# Patient Record
Sex: FEMALE | Race: BLACK OR AFRICAN AMERICAN | NOT HISPANIC OR LATINO | Employment: OTHER | URBAN - METROPOLITAN AREA
[De-identification: names, ages, dates, MRNs, and addresses within clinical notes are randomized per-mention and may not be internally consistent; named-entity substitution may affect disease eponyms.]

---

## 2019-06-27 PROBLEM — I10 ESSENTIAL HYPERTENSION: Status: ACTIVE | Noted: 2019-06-27

## 2019-06-27 PROBLEM — H91.90 HEARING LOSS: Status: ACTIVE | Noted: 2019-06-27

## 2019-06-27 PROBLEM — Z00.00 ANNUAL PHYSICAL EXAM: Status: ACTIVE | Noted: 2019-06-27

## 2019-06-27 PROBLEM — Z00.00 ANNUAL PHYSICAL EXAM: Status: RESOLVED | Noted: 2019-06-27 | Resolved: 2019-06-27

## 2019-09-05 PROBLEM — H90.3 SENSORINEURAL HEARING LOSS (SNHL) OF BOTH EARS: Status: ACTIVE | Noted: 2019-06-27

## 2019-12-23 PROBLEM — R87.619 ATYPICAL GLANDULAR CELLS ON CERVICAL PAP SMEAR: Status: ACTIVE | Noted: 2019-12-23

## 2020-01-28 PROBLEM — L60.9 LOOSE TOENAIL: Status: ACTIVE | Noted: 2020-01-28

## 2020-01-28 PROBLEM — E78.5 HYPERLIPIDEMIA: Status: ACTIVE | Noted: 2020-01-28

## 2020-01-28 PROBLEM — I25.10 CORONARY ARTERY DISEASE: Status: ACTIVE | Noted: 2020-01-28

## 2020-01-28 PROBLEM — M79.675 PAIN AROUND TOENAIL, LEFT FOOT: Status: ACTIVE | Noted: 2020-01-28

## 2022-02-23 PROBLEM — Z87.42 HISTORY OF POSTMENOPAUSAL BLEEDING: Status: ACTIVE | Noted: 2022-02-23

## 2022-04-29 PROBLEM — N87.0 CIN I (CERVICAL INTRAEPITHELIAL NEOPLASIA I): Status: ACTIVE | Noted: 2022-04-29

## 2022-06-19 DIAGNOSIS — U07.1 COVID-19 VIRUS DETECTED: ICD-10-CM

## 2022-07-22 ENCOUNTER — PATIENT OUTREACH (OUTPATIENT)
Dept: ADMINISTRATIVE | Facility: HOSPITAL | Age: 67
End: 2022-07-22
Payer: MEDICARE

## 2022-07-23 PROBLEM — I16.0 HYPERTENSIVE URGENCY: Status: ACTIVE | Noted: 2022-07-23

## 2022-07-23 PROBLEM — M25.552 LEFT HIP PAIN: Status: ACTIVE | Noted: 2022-07-23

## 2022-07-24 PROBLEM — E87.6 HYPOKALEMIA: Status: ACTIVE | Noted: 2022-07-24

## 2022-07-25 PROBLEM — R00.1 BRADYCARDIA: Status: ACTIVE | Noted: 2022-07-25

## 2022-07-25 PROBLEM — E66.9 OBESITY (BMI 30-39.9): Status: ACTIVE | Noted: 2022-07-25

## 2023-06-06 PROBLEM — R00.1 BRADYCARDIA: Status: RESOLVED | Noted: 2022-07-25 | Resolved: 2023-06-06

## 2023-06-06 PROBLEM — M79.675 PAIN AROUND TOENAIL, LEFT FOOT: Status: RESOLVED | Noted: 2020-01-28 | Resolved: 2023-06-06

## 2023-06-06 PROBLEM — E87.6 HYPOKALEMIA: Status: RESOLVED | Noted: 2022-07-24 | Resolved: 2023-06-06

## 2023-06-06 PROBLEM — I16.0 HYPERTENSIVE URGENCY: Status: RESOLVED | Noted: 2022-07-23 | Resolved: 2023-06-06

## 2023-06-06 PROBLEM — L60.9 LOOSE TOENAIL: Status: RESOLVED | Noted: 2020-01-28 | Resolved: 2023-06-06

## 2023-06-07 ENCOUNTER — PATIENT OUTREACH (OUTPATIENT)
Dept: ADMINISTRATIVE | Facility: HOSPITAL | Age: 68
End: 2023-06-07
Payer: MEDICARE

## 2023-09-04 PROBLEM — I63.9 ACUTE STROKE DUE TO ISCHEMIA: Status: ACTIVE | Noted: 2023-09-04

## 2023-09-05 PROBLEM — G81.91 HEMIPARESIS, RIGHT: Status: ACTIVE | Noted: 2023-09-05

## 2023-09-05 PROBLEM — I48.20 CHRONIC ATRIAL FIBRILLATION: Status: ACTIVE | Noted: 2023-09-05

## 2023-09-07 ENCOUNTER — PATIENT OUTREACH (OUTPATIENT)
Dept: ADMINISTRATIVE | Facility: CLINIC | Age: 68
End: 2023-09-07
Payer: MEDICARE

## 2023-09-07 NOTE — PROGRESS NOTES
C3 nurse attempted to contact patient. The following occurred:   C3 nurse attempted to contact Hanane Swanson  for a TCC post hospital discharge follow up call. The patient is unable to conduct the call @ this time. The patient requested a callback.    Non [facility] PCP

## 2023-09-07 NOTE — PROGRESS NOTES
C3 nurse spoke with Hanane Swanson  for a TCC post hospital discharge follow up call. The patient does not have a scheduled HOSFU appointment with Mago Elizabeth MD  within 5-7 days post hospital discharge date 09/06/2023. C3 nurse was unable to schedule HOSFU appointment in [facility].    Please contact pcp  and schedule follow up appointment using HOSFU visit type on or before 09/13/2023.

## 2023-09-07 NOTE — PROGRESS NOTES
C3 nurse attempted to contact patient. The following occurred:   C3 nurse attempted to contact Hanane Swanson  for a TCC post hospital discharge follow up call. The patient is unable to conduct the call @ this time. The patient requested a callback.    The patient does not have a scheduled HOSFU appointment within 5-7 days post hospital discharge date 09/06/2023. Non [facility] PCP

## 2023-09-14 PROBLEM — R29.90 STROKE-LIKE SYMPTOMS: Status: ACTIVE | Noted: 2023-09-14

## 2023-09-15 PROBLEM — R53.1 GENERALIZED WEAKNESS: Status: ACTIVE | Noted: 2023-09-15

## 2023-09-15 PROBLEM — K59.00 CONSTIPATION: Status: ACTIVE | Noted: 2023-09-15

## 2023-09-15 PROBLEM — I35.0 AORTIC STENOSIS: Status: ACTIVE | Noted: 2023-09-15

## 2023-09-15 PROBLEM — E66.09 CLASS 1 OBESITY DUE TO EXCESS CALORIES WITH SERIOUS COMORBIDITY AND BODY MASS INDEX (BMI) OF 32.0 TO 32.9 IN ADULT: Status: ACTIVE | Noted: 2022-07-25

## 2023-09-15 PROBLEM — F33.1 MODERATE EPISODE OF RECURRENT MAJOR DEPRESSIVE DISORDER: Status: ACTIVE | Noted: 2023-09-15

## 2023-12-11 PROBLEM — I63.9 ACUTE STROKE DUE TO ISCHEMIA: Status: RESOLVED | Noted: 2023-09-04 | Resolved: 2023-12-11

## 2024-01-10 ENCOUNTER — TELEPHONE (OUTPATIENT)
Dept: ADMINISTRATIVE | Facility: CLINIC | Age: 69
End: 2024-01-10
Payer: MEDICARE

## 2024-05-16 ENCOUNTER — PATIENT OUTREACH (OUTPATIENT)
Dept: ADMINISTRATIVE | Facility: HOSPITAL | Age: 69
End: 2024-05-16
Payer: MEDICARE

## 2024-06-05 ENCOUNTER — PATIENT MESSAGE (OUTPATIENT)
Dept: ADMINISTRATIVE | Facility: HOSPITAL | Age: 69
End: 2024-06-05
Payer: MEDICARE

## 2024-06-05 ENCOUNTER — PATIENT OUTREACH (OUTPATIENT)
Dept: ADMINISTRATIVE | Facility: HOSPITAL | Age: 69
End: 2024-06-05
Payer: MEDICARE

## 2024-08-05 ENCOUNTER — PATIENT OUTREACH (OUTPATIENT)
Dept: ADMINISTRATIVE | Facility: HOSPITAL | Age: 69
End: 2024-08-05
Payer: MEDICARE

## 2024-08-18 ENCOUNTER — PATIENT OUTREACH (OUTPATIENT)
Dept: ADMINISTRATIVE | Facility: HOSPITAL | Age: 69
End: 2024-08-18
Payer: MEDICARE

## 2024-09-03 PROBLEM — F32.A DEPRESSION: Status: ACTIVE | Noted: 2024-09-03

## 2024-09-03 PROBLEM — Z87.42 HISTORY OF POSTMENOPAUSAL BLEEDING: Status: RESOLVED | Noted: 2022-02-23 | Resolved: 2024-09-03

## 2024-09-03 PROBLEM — G81.91 HEMIPARESIS, RIGHT: Status: RESOLVED | Noted: 2023-09-05 | Resolved: 2024-09-03

## 2024-09-03 PROBLEM — R53.1 GENERALIZED WEAKNESS: Status: RESOLVED | Noted: 2023-09-15 | Resolved: 2024-09-03

## 2024-09-03 PROBLEM — R29.90 STROKE-LIKE SYMPTOMS: Status: RESOLVED | Noted: 2023-09-14 | Resolved: 2024-09-03

## 2024-09-04 PROBLEM — R53.1 WEAKNESS: Status: ACTIVE | Noted: 2024-09-04

## 2024-09-05 PROBLEM — I16.0 HYPERTENSIVE URGENCY: Status: RESOLVED | Noted: 2022-07-23 | Resolved: 2024-09-05

## 2024-09-07 PROBLEM — R51.9 ACUTE NONINTRACTABLE HEADACHE: Status: ACTIVE | Noted: 2024-09-07

## 2024-09-09 ENCOUNTER — PATIENT OUTREACH (OUTPATIENT)
Dept: ADMINISTRATIVE | Facility: CLINIC | Age: 69
End: 2024-09-09
Payer: MEDICARE

## 2024-09-12 ENCOUNTER — OUTPATIENT CASE MANAGEMENT (OUTPATIENT)
Dept: ADMINISTRATIVE | Facility: OTHER | Age: 69
End: 2024-09-12
Payer: MEDICARE

## 2024-09-18 ENCOUNTER — OUTPATIENT CASE MANAGEMENT (OUTPATIENT)
Dept: ADMINISTRATIVE | Facility: OTHER | Age: 69
End: 2024-09-18
Payer: MEDICARE

## 2024-09-18 NOTE — PROGRESS NOTES
Outpatient Care Management  Patient Not Qualified    Patient: Hanane Swanson  MRN:  [id number]  Date of Service:  9/18/2024  Completed by:  Ceci Wolfe RN    Chief Complaint   Patient presents with    OPCM Enrollment Call     3rd Attempt.     Case Closure       Patient Summary           Reason Not Qualified:  Unable to reach

## 2024-09-19 ENCOUNTER — PATIENT OUTREACH (OUTPATIENT)
Dept: ADMINISTRATIVE | Facility: HOSPITAL | Age: 69
End: 2024-09-19
Payer: MEDICARE